# Patient Record
Sex: MALE | Race: WHITE | NOT HISPANIC OR LATINO | Employment: FULL TIME | URBAN - METROPOLITAN AREA
[De-identification: names, ages, dates, MRNs, and addresses within clinical notes are randomized per-mention and may not be internally consistent; named-entity substitution may affect disease eponyms.]

---

## 2017-09-05 ENCOUNTER — HOSPITAL ENCOUNTER (EMERGENCY)
Facility: OTHER | Age: 27
Discharge: HOME | End: 2017-09-05
Attending: EMERGENCY MEDICINE
Payer: COMMERCIAL

## 2017-09-05 VITALS
SYSTOLIC BLOOD PRESSURE: 119 MMHG | OXYGEN SATURATION: 100 % | HEIGHT: 72 IN | BODY MASS INDEX: 35.21 KG/M2 | HEART RATE: 85 BPM | RESPIRATION RATE: 14 BRPM | WEIGHT: 260 LBS | TEMPERATURE: 99 F | DIASTOLIC BLOOD PRESSURE: 58 MMHG

## 2017-09-05 DIAGNOSIS — N20.0 KIDNEY STONE ON LEFT SIDE: Primary | ICD-10-CM

## 2017-09-05 LAB
ANION GAP SERPL CALC-SCNC: 12 MMOL/L
BACTERIA #/AREA URNS HPF: ABNORMAL /HPF
BASOPHILS # BLD AUTO: 0.02 K/UL
BASOPHILS NFR BLD: 0.2 %
BILIRUB UR QL STRIP: NEGATIVE
BUN SERPL-MCNC: 19 MG/DL
CALCIUM SERPL-MCNC: 9.9 MG/DL
CHLORIDE SERPL-SCNC: 104 MMOL/L
CLARITY UR: CLEAR
CO2 SERPL-SCNC: 22 MMOL/L
COLOR UR: YELLOW
CREAT SERPL-MCNC: 0.9 MG/DL
DIFFERENTIAL METHOD: NORMAL
EOSINOPHIL # BLD AUTO: 0.2 K/UL
EOSINOPHIL NFR BLD: 1.7 %
ERYTHROCYTE [DISTWIDTH] IN BLOOD BY AUTOMATED COUNT: 12.7 %
EST. GFR  (AFRICAN AMERICAN): >60 ML/MIN/1.73 M^2
EST. GFR  (NON AFRICAN AMERICAN): >60 ML/MIN/1.73 M^2
GLUCOSE SERPL-MCNC: 87 MG/DL
GLUCOSE UR QL STRIP: NEGATIVE
HCT VFR BLD AUTO: 47.4 %
HGB BLD-MCNC: 16.5 G/DL
HGB UR QL STRIP: ABNORMAL
KETONES UR QL STRIP: NEGATIVE
LEUKOCYTE ESTERASE UR QL STRIP: NEGATIVE
LYMPHOCYTES # BLD AUTO: 2.2 K/UL
LYMPHOCYTES NFR BLD: 20.1 %
MCH RBC QN AUTO: 30.3 PG
MCHC RBC AUTO-ENTMCNC: 34.8 G/DL
MCV RBC AUTO: 87 FL
MICROSCOPIC COMMENT: ABNORMAL
MONOCYTES # BLD AUTO: 0.9 K/UL
MONOCYTES NFR BLD: 8.3 %
NEUTROPHILS # BLD AUTO: 7.7 K/UL
NEUTROPHILS NFR BLD: 69.2 %
NITRITE UR QL STRIP: NEGATIVE
PH UR STRIP: 6 [PH] (ref 5–8)
PLATELET # BLD AUTO: 233 K/UL
PMV BLD AUTO: 9.5 FL
POTASSIUM SERPL-SCNC: 3.7 MMOL/L
PROT UR QL STRIP: NEGATIVE
RBC # BLD AUTO: 5.44 M/UL
RBC #/AREA URNS HPF: >100 /HPF (ref 0–4)
SODIUM SERPL-SCNC: 138 MMOL/L
SP GR UR STRIP: 1.02 (ref 1–1.03)
SQUAMOUS #/AREA URNS HPF: 6 /HPF
URN SPEC COLLECT METH UR: ABNORMAL
UROBILINOGEN UR STRIP-ACNC: NEGATIVE EU/DL
WBC # BLD AUTO: 11.11 K/UL
WBC #/AREA URNS HPF: 0 /HPF (ref 0–5)
WBC CLUMPS URNS QL MICRO: ABNORMAL
YEAST URNS QL MICRO: ABNORMAL

## 2017-09-05 PROCEDURE — 96375 TX/PRO/DX INJ NEW DRUG ADDON: CPT

## 2017-09-05 PROCEDURE — 25000003 PHARM REV CODE 250: Performed by: EMERGENCY MEDICINE

## 2017-09-05 PROCEDURE — 81000 URINALYSIS NONAUTO W/SCOPE: CPT

## 2017-09-05 PROCEDURE — 99284 EMERGENCY DEPT VISIT MOD MDM: CPT | Mod: 25

## 2017-09-05 PROCEDURE — 85025 COMPLETE CBC W/AUTO DIFF WBC: CPT

## 2017-09-05 PROCEDURE — 80048 BASIC METABOLIC PNL TOTAL CA: CPT

## 2017-09-05 PROCEDURE — 96374 THER/PROPH/DIAG INJ IV PUSH: CPT

## 2017-09-05 PROCEDURE — 63600175 PHARM REV CODE 636 W HCPCS: Performed by: EMERGENCY MEDICINE

## 2017-09-05 PROCEDURE — 96361 HYDRATE IV INFUSION ADD-ON: CPT

## 2017-09-05 RX ORDER — HYDROCODONE BITARTRATE AND ACETAMINOPHEN 5; 325 MG/1; MG/1
1 TABLET ORAL EVERY 4 HOURS PRN
Qty: 14 TABLET | Refills: 0 | Status: SHIPPED | OUTPATIENT
Start: 2017-09-05

## 2017-09-05 RX ORDER — TAMSULOSIN HYDROCHLORIDE 0.4 MG/1
0.4 CAPSULE ORAL DAILY
Qty: 30 CAPSULE | Refills: 0 | Status: SHIPPED | OUTPATIENT
Start: 2017-09-05 | End: 2018-09-05

## 2017-09-05 RX ORDER — KETOROLAC TROMETHAMINE 30 MG/ML
30 INJECTION, SOLUTION INTRAMUSCULAR; INTRAVENOUS
Status: COMPLETED | OUTPATIENT
Start: 2017-09-05 | End: 2017-09-05

## 2017-09-05 RX ORDER — ONDANSETRON 4 MG/1
4 TABLET, ORALLY DISINTEGRATING ORAL EVERY 8 HOURS PRN
Qty: 18 TABLET | Refills: 0 | Status: SHIPPED | OUTPATIENT
Start: 2017-09-05

## 2017-09-05 RX ORDER — ONDANSETRON 2 MG/ML
4 INJECTION INTRAMUSCULAR; INTRAVENOUS
Status: COMPLETED | OUTPATIENT
Start: 2017-09-05 | End: 2017-09-05

## 2017-09-05 RX ADMIN — KETOROLAC TROMETHAMINE 30 MG: 30 INJECTION, SOLUTION INTRAMUSCULAR at 01:09

## 2017-09-05 RX ADMIN — ONDANSETRON 4 MG: 2 INJECTION INTRAMUSCULAR; INTRAVENOUS at 01:09

## 2017-09-05 RX ADMIN — SODIUM CHLORIDE 1000 ML: 0.9 INJECTION, SOLUTION INTRAVENOUS at 01:09

## 2017-09-05 NOTE — ED PROVIDER NOTES
"Encounter Date: 9/5/2017    SCRIBE #1 NOTE: I, Monica Clarence , am scribing for, and in the presence of, Dr. Chan.       History     Chief Complaint   Patient presents with    Flank Pain     Patient c/o left flank pain that radiates to left groin since earlier today.  Patient denies dysuria.  Patient stated this happened several years ago but he just "suffered in his dorm".      Time seen by provider: 1:02 AM    This is a 27 y.o. Male, with history of anxiety, who presents with complaint of flank pain that began yesterday. Left flank pain was initially constant, but is currently described as intermittent and radiating to the groin. Pt reports nausea, but denies fever, chills, vomiting, abdominal pain, myalgias, penile pain, testicular pain, or any urinary symptoms. He denies any alleviating factors, and is compliant with psychiatric medication. Symptoms are consistent with a previous episode that occurred two years ago.      The history is provided by the patient.     Review of patient's allergies indicates:   Allergen Reactions    Nuts [tree nut]      Past Medical History:   Diagnosis Date    Asthma      History reviewed. No pertinent surgical history.  History reviewed. No pertinent family history.  Social History   Substance Use Topics    Smoking status: Never Smoker    Smokeless tobacco: Never Used    Alcohol use No     Review of Systems   Constitutional: Negative for chills and fever.   HENT: Negative for sore throat.    Respiratory: Negative for shortness of breath.    Cardiovascular: Negative for chest pain.   Gastrointestinal: Positive for nausea. Negative for abdominal pain and vomiting.   Genitourinary: Positive for flank pain (left). Negative for decreased urine volume, difficulty urinating, dysuria, frequency, hematuria, penile pain, scrotal swelling, testicular pain and urgency.   Musculoskeletal: Negative for back pain and myalgias.   Skin: Negative for rash.   Neurological: Negative for weakness. "   Hematological: Does not bruise/bleed easily.       Physical Exam     Initial Vitals [09/05/17 0039]   BP Pulse Resp Temp SpO2   (!) 140/83 85 16 98.8 °F (37.1 °C) 99 %      MAP       102         Physical Exam    Nursing note and vitals reviewed.  Constitutional: He appears well-developed and well-nourished. He is not diaphoretic. No distress.   HENT:   Head: Normocephalic and atraumatic.   Right Ear: External ear normal.   Left Ear: External ear normal.   Eyes: Conjunctivae and EOM are normal. Pupils are equal, round, and reactive to light. Right eye exhibits no discharge. Left eye exhibits no discharge.   Neck: Normal range of motion. Neck supple. No tracheal deviation present.   Cardiovascular: Normal rate, regular rhythm, normal heart sounds and intact distal pulses. Exam reveals no gallop and no friction rub.    No murmur heard.  Pulmonary/Chest: Breath sounds normal. No stridor. No respiratory distress. He has no wheezes. He has no rhonchi. He has no rales.   Abdominal: Soft. Bowel sounds are normal. He exhibits no distension. There is no tenderness. There is no rebound and no guarding.   Genitourinary: Penis normal.   Genitourinary Comments: Circumcised penis. No testicular tenderness. No inguinal hernia. No scrotal mass.    Musculoskeletal: Normal range of motion. He exhibits no edema or tenderness.   No CVA tenderness.    Neurological: He is alert and oriented to person, place, and time. He has normal strength. No cranial nerve deficit.   Skin: Skin is warm and dry. Capillary refill takes less than 2 seconds. No erythema. No pallor.   Psychiatric: He has a normal mood and affect. Thought content normal.         ED Course   Procedures  Labs Reviewed   URINALYSIS - Abnormal; Notable for the following:        Result Value    Occult Blood UA 3+ (*)     All other components within normal limits   BASIC METABOLIC PANEL - Abnormal; Notable for the following:     CO2 22 (*)     All other components within normal  limits   URINALYSIS MICROSCOPIC - Abnormal; Notable for the following:     RBC, UA >100 (*)     All other components within normal limits   CBC W/ AUTO DIFFERENTIAL     Imaging Results          CT Renal Stone Study ABD Pelvis WO (Final result)  Result time 09/05/17 01:23:17    Final result by Rj Cruz MD (09/05/17 01:23:17)                 Impression:        No renal calculi or hydronephrosis.     2.7 mm calculus in the distal LEFT ureter just above the LEFT UVJ with very minimal proximal LEFT hydroureter compared to RIGHT side.      Electronically signed by: RJ CRUZ MD  Date:     09/05/17  Time:    01:23              Narrative:    Clinical History: Flank Pain    Technique: Axial images from the top of the kidneys through the urinary bladder were obtained at 5-mm intervals without the administration of contrast following the renal stone study protocol.    Comparison:No comparisons    Findings:    The lung bases are clear.    No renal calculi or hydronephrosis. 2.7 mm calculus in the distal LEFT ureter just above the LEFT UVJ with very minimal proximal LEFT hydroureter compared to RIGHT side. No RIGHT ureteral calculus or hydroureter. No bladder calculus.    The unenhanced liver, spleen, gallbladder, pancreas, adrenal glands and kidneys appear unremarkable. Aorta tapers normally. No bowel obstruction or abnormal bowel wall thickening. No pneumoperitoneum, free fluid or fluid collection. Urinary bladder appears unremarkable.                                        Medical Decision Making:   Clinical Tests:   Lab Tests: Ordered and Reviewed  Radiological Study: Ordered and Reviewed  ED Management:  Well-appearing patient presents complaining of history and physical concerning for kidney stone.  CAT scan confirms a 2.7 mm stone.  Should be on pass on his own.  No signs of infection.  No CVAT hydro-.  No signs of impaired kidney function on lab work.  Prescribed short course of hydrocodone and Zofran and  Flomax.  Primary with urology follow-up.    I did have an extensive talk regarding signs to return for and need for follow up. Patient expressed understanding and will monitor symptoms closely and follow-up as needed.    ZION Chan M.D.  09/05/2017  4:02 AM                Scribe Attestation:   Scribe #1: I performed the above scribed service and the documentation accurately describes the services I performed. I attest to the accuracy of the note.    Attending Attestation:           Physician Attestation for Scribe:  Physician Attestation Statement for Scribe #1: I, Dr. Chan, reviewed documentation, as scribed by Monica Lima  in my presence, and it is both accurate and complete.                 ED Course      Clinical Impression:     1. Kidney stone on left side                                 Jonathan Chan MD  09/05/17 0408

## 2017-09-05 NOTE — ED TRIAGE NOTES
"Pt presents to ED with c/o left flank pain starting at 10 pm. Pt states the pain is radiating to his groin, rated 6/10, described as "muscle spasm type ache", intermittent. Pt denies any use of OTC medications. Pt has hx of kidney stone aprox 5 yrs ago, stating "I think it was the same side but I can't remember". Pt denies dysuria or trouble voiding.   "

## 2018-12-09 ENCOUNTER — HOSPITAL ENCOUNTER (EMERGENCY)
Facility: OTHER | Age: 28
Discharge: HOME | End: 2018-12-09
Attending: EMERGENCY MEDICINE
Payer: COMMERCIAL

## 2018-12-09 VITALS
TEMPERATURE: 99 F | OXYGEN SATURATION: 98 % | HEIGHT: 72 IN | SYSTOLIC BLOOD PRESSURE: 130 MMHG | RESPIRATION RATE: 17 BRPM | HEART RATE: 100 BPM | WEIGHT: 285 LBS | BODY MASS INDEX: 38.6 KG/M2 | DIASTOLIC BLOOD PRESSURE: 78 MMHG

## 2018-12-09 DIAGNOSIS — S99.919A ANKLE INJURY: ICD-10-CM

## 2018-12-09 DIAGNOSIS — S93.421A SPRAIN OF DELTOID LIGAMENT OF RIGHT ANKLE, INITIAL ENCOUNTER: Primary | ICD-10-CM

## 2018-12-09 DIAGNOSIS — M79.671 FOOT PAIN, RIGHT: ICD-10-CM

## 2018-12-09 PROCEDURE — 99283 EMERGENCY DEPT VISIT LOW MDM: CPT | Mod: 25

## 2018-12-09 RX ORDER — IBUPROFEN 600 MG/1
600 TABLET ORAL EVERY 6 HOURS PRN
Qty: 20 TABLET | Refills: 0 | Status: SHIPPED | OUTPATIENT
Start: 2018-12-09

## 2018-12-09 NOTE — ED PROVIDER NOTES
Encounter Date: 12/9/2018    SCRIBE #1 NOTE: I, Donnie Gordillo, am scribing for, and in the presence of, Dr. Rg .       History     Chief Complaint   Patient presents with    Ankle Pain     Pt c/o rolling his ankle while walking down stairs on Monday. Pt c/o difficulty bearing weight on the right foot with decreased ROM. Pt c/o worsening pain.     Time seen by provider: 2:40 PM    This is a 28 y.o. male who presents s/p right ankle injury that occurred six days ago. Patient reports rolling his right ankle while walking down the stairs. He states the pain is exacerbated with ambulating and movement of the foot. He has taken Ibuprofen and Aleve with no pain relief. He denies numbness or tingling. He has no additional complaints.         The history is provided by the patient.     Review of patient's allergies indicates:   Allergen Reactions    Nuts [tree nut]      Past Medical History:   Diagnosis Date    Asthma      No past surgical history on file.  No family history on file.  Social History     Tobacco Use    Smoking status: Never Smoker    Smokeless tobacco: Never Used   Substance Use Topics    Alcohol use: No    Drug use: No     Review of Systems   Musculoskeletal:        Positive for right ankle pain.    Neurological: Negative for numbness.        Negative for tingling.        Physical Exam     Initial Vitals [12/09/18 1435]   BP Pulse Resp Temp SpO2   (!) 139/98 (!) 118 18 99.6 °F (37.6 °C) 98 %      MAP       --         Physical Exam    Nursing note and vitals reviewed.  Constitutional: He appears well-developed and well-nourished. No distress.   HENT:   Head: Normocephalic and atraumatic.   Eyes: Conjunctivae and EOM are normal.   Neck: Normal range of motion. Neck supple.   Cardiovascular:   Pulses:       Dorsalis pedis pulses are 2+ on the right side.   Musculoskeletal: Normal range of motion. He exhibits tenderness.   Tenderness to palpation just underneath medial malleolus.    Neurological: He  is alert and oriented to person, place, and time. He has normal strength. No sensory deficit.   Skin: Skin is warm and dry. Capillary refill takes less than 2 seconds.   Psychiatric: He has a normal mood and affect. His behavior is normal. Judgment and thought content normal.         ED Course   Procedures  Labs Reviewed - No data to display       Imaging Results          X-Ray Ankle Complete Right (In process)               X-Rays:   Independently Interpreted Readings:   Other Readings:  Right ankle: No fracture or dislocation.     Medical Decision Making:   Clinical Tests:   Radiological Study: Ordered and Reviewed  ED Management:  A 28-year-old male with ankle pain after rolling it 5 days ago.  Based on my assessment I suspect this brain.  X-ray rules out fracture.  Will discharge with Ace wrap and crutches to follow up with Orthopedics as an outpatient.    Patient discharged home in stable condition. Diagnosis and treatment plan explained to patient. I have answered all questions and the patient is satisfied with the plan of care. The patient demonstrates understanding of the care plan. This is the extent to the patients complaints today here in the emergency department.            Scribe Attestation:   Scribe #1: I performed the above scribed service and the documentation accurately describes the services I performed. I attest to the accuracy of the note.    Attending Attestation:           Physician Attestation for Scribe:  Physician Attestation Statement for Scribe #1: I, Dr. Rg, reviewed documentation, as scribed by Donnie Gordillo  in my presence, and it is both accurate and complete.                    Clinical Impression:     1. Sprain of deltoid ligament of right ankle, initial encounter    2. Foot pain, right    3. Ankle injury                                   Phil Rg, DO  12/09/18 1523

## 2018-12-09 NOTE — ED TRIAGE NOTES
Pt reports twisting ankle while going down stairs. Pt reports pain to R foot radiating down into arch of foot. Slight swelling noted. Pt reports inability to bear weight without pain. No obvious deformity noted.